# Patient Record
(demographics unavailable — no encounter records)

---

## 2017-05-17 NOTE — ED PDOC
- Laboratory Results


Result Diagrams: 


 05/17/17 00:30





 05/17/17 00:30





- ECG


O2 Sat by Pulse Oximetry: 97





Medical Decision Making


Medical Decision Making: 





Time: 0700





Patient signed out by Dr. Aguayo pending sobriety and crisis evaluation








Scribe Attestation:


Documented by Henna Sage acting as a scribe for Jenny Pereira MD MD Scribe Attestation:


All medical record entries made by the Scribe were at my direction and 

personally dictated by me. I have reviewed the chart and agree that the record 

accurately reflects my personal performance of the history, physical exam, 

medical decision making, and the department course for this patient. I have 

also personally directed, reviewed, and agree with the discharge instructions 

and disposition.





Disposition





- Clinical Impression


Clinical Impression: 


 Alcohol intoxication, Alcohol-induced mood disorder








- POA


Present On Arrival: None





- Disposition


Disposition: Routine/Home


Disposition Time: 10:02


Condition: STABLE

## 2017-05-17 NOTE — ED PDOC
HPI: Psych/Substance Abuse


Time Seen by Provider: 05/17/17 00:03


Chief Complaint (Nursing): Psychiatric Evaluation


ED Caveat: Acuity of Condition


History Per: Patient, EMS


History/Exam Limitations: intoxication


Onset/Duration Of Symptoms: Hrs


Current Symptoms Are (Timing): Still Present


Modifying Factor(s): Alcohol


Additional Complaint(s): 





Pt. brought in by EMS for "psych eval".  PT. admits to drinking alcohol, 

unclear if he is taking his psych meds.  Had verbal argument with partner 

today.  HPI and ROS limited 2/2 to intoxication.





Past Medical History


Vital Signs: 





 Last Vital Signs











Temp  98.9 F   05/16/17 23:55


 


Pulse  160 H  05/16/17 23:55


 


Resp  20   05/16/17 23:55


 


BP      


 


Pulse Ox  97   05/16/17 23:55














- Medical History


PMH: Bipolar Disorder





- Family History


Family History: States: Unknown Family Hx





- Allergies


Allergies/Adverse Reactions: 


 Allergies











Allergy/AdvReac Type Severity Reaction Status Date / Time


 


No Known Allergies Allergy   Verified 05/16/17 23:54














Review of Systems


Review Of Systems: ROS cannot be obtained secondary to pt's inabilty to answer 

questions.





Physical Exam





- Reviewed


Nursing Documentation Reviewed: Yes


Vital Signs Reviewed: Yes





- Physical Exam


Appears: Positive for: Non-toxic.  Negative for: Well (combative, intoxicated 

appearing)


Head Exam: Positive for: ATRAUMATIC, NORMAL INSPECTION, NORMOCEPHALIC


Skin: Positive for: Normal Color, Warm, DRY


Eye Exam: Positive for: EOMI, Normal appearance, PERRL


ENT: Positive for: Normal ENT Inspection


Neck: Positive for: Normal, Painless ROM


Cardiovascular/Chest: Positive for: Tachycardia


Respiratory: Positive for: CNT, Normal Breath Sounds


Gastrointestinal/Abdominal: Positive for: Normal Exam, Bowel Sounds, Soft


Back: Positive for: Normal Inspection


Extremity: Positive for: Normal ROM


Neurologic/Psych: Positive for: Alert, CNs II-XII.  Negative for: Oriented (

unwilling to answer questions properly), Motor/Sensory Deficits, Cerebellar 

Tests, Gait, Aphasia, Facial Droop





- Laboratory Results


Result Diagrams: 


 05/17/17 00:30





 05/17/17 00:30





- ECG


O2 Sat by Pulse Oximetry: 97





Medical Decision Making


Medical Decision Making: 





PT. likely w/ intoxication and psych derailment 2/2 to noncompliance with meds.

  Pt. verbally abusive and threatening to staff requiring chemical sedation.  

Will continue to monitor.  No signs of trauma. 





ED OBSERVATION


Date of observation admission: 05/17/17


Time of observation admission: 01:45





- Observation admission statement


Patient is being placed in observation because:: 





patient is intoxicated, will take significant time for patient to sober up, 

also was given chemical sedation.  clinical sobriety estimated between 5-10 

hours.





- Goals of Observation


Goals of observation are:: 





sobriety.





Disposition





- Clinical Impression


Clinical Impression: 


 Alcohol intoxication








- Disposition


Disposition: Transfer of Care


Disposition Time: 07:00


Condition: STABLE


Patient Signed Over To: Jenny Pereira


Handoff Comments: pending sobriety and Crisis eval

## 2017-10-17 NOTE — ED PDOC
HPI: Psych/Substance Abuse


Time Seen by Provider: 10/17/17 15:18


Chief Complaint (Nursing): Psychiatric Evaluation


Chief Complaint (Provider): crisis eval


History Per: Patient, Family (wife)


History/Exam Limitations: no limitations


Onset/Duration Of Symptoms: Mins (prior to arrival)


Current Symptoms Are (Timing): Still Present


Additional Complaint(s): 





Ramos Reddy is a 44 year old male with previous medical history of bipolar 

disorder, who presents to the emergency department for a psychiatric evaluation 

after wife called police when patient became agitated and combative at home 

prior to arrival. 





PMD: none provided





Past Medical History


Reviewed: Historical Data, Nursing Documentation, Vital Signs


Vital Signs: 





 Last Vital Signs











Temp  97.8 F   10/17/17 14:46


 


Pulse  78   10/17/17 14:46


 


Resp  18   10/17/17 14:46


 


BP  124/75   10/17/17 14:46


 


Pulse Ox  99   10/17/17 14:46














- Medical History


PMH: Bipolar Disorder


   Denies: Diabetes, Hepatitis, HIV, HTN, Chronic Kidney Disease, Seizures, 

Sexually Transmitted Disease





- Surgical History


Surgical History: 


   Denies: No Surg Hx


Other surgeries: right knee





- Family History


Family History: States: Unknown Family Hx





- Social History


Current smoker - smoking cessation education provided: Yes


Alcohol: Occasional


Drugs: Denies





- Immunization History


Hx Tetanus Toxoid Vaccination: No


Hx Influenza Vaccination: No


Hx Pneumococcal Vaccination: No





- Home Medications


Home Medications: 


 Ambulatory Orders











 Medication  Instructions  Recorded


 


Abilify 1 tab PO DAILY 09/02/17


 


Risperidone [Risperdal] 1 mg PO DAILY 09/02/17














- Allergies


Allergies/Adverse Reactions: 


 Allergies











Allergy/AdvReac Type Severity Reaction Status Date / Time


 


Iodine and Iodide Containing Allergy   Verified 09/02/17 14:10





Produc     














Review of Systems


ROS Statement: Except As Marked, All Systems Reviewed And Found Negative


Psych: Positive for: Other (agitated and combative)





Physical Exam





- Reviewed


Nursing Documentation Reviewed: Yes


Vital Signs Reviewed: Yes





- Physical Exam


Appears: Positive for: Well, Non-toxic, No Acute Distress


Head Exam: Positive for: ATRAUMATIC, NORMAL INSPECTION, NORMOCEPHALIC


Cardiovascular/Chest: Positive for: Regular Rate, Rhythm, Chest Non Tender.  

Negative for: Bradycardia, Tachycardia


Respiratory: Positive for: Normal Breath Sounds, Accessory Muscle Use.  

Negative for: Decreased Breath Sounds, Crackles, Rales, Rhonchi, Wheezing, 

Respiratory Distress


Gastrointestinal/Abdominal: Positive for: Normal Exam, Bowel Sounds, Soft.  

Negative for: Tenderness


Neurologic/Psych: Positive for: Alert (x3), Oriented, Mood/Affect (agitated and 

combative)





- Laboratory Results


Result Diagrams: 


 10/17/17 16:25





 10/17/17 16:25





- ECG


O2 Sat by Pulse Oximetry: 99 (RA)


Pulse Ox Interpretation: Normal





Medical Decision Making


Medical Decision Making: 





Initial Impression: Psychiatric evaluation





Initial Plan:


* Acetaminophen


* Alcohol serum


* BMP


* Drug screen, urine


* Salicylate 


* CBC


* Urinalysis


* Crisis evaluation





Time: 1930


alcohol elevated


opiats and marijuana in urine


pt medically cleared for psychiatric evaluation





--Upon crisis evaluation, patient will be discharged home and advised to follow 

up with Dr. Alva. Counseling was provided and all questions were answered 

regarding diagnosis. There is agreement to discharge plan. Return if symptoms 

persist or worsen.





Clinical Impression: Alcohol intoxication





--------------------------------------------------------------------------------

-----------------


Scribe Attestation:


Documented by Riddhi Feliciano, acting as a scribe for Mona Weeks MD.





Provider Scribe Attestation:


All medical record entries made by the Scribe were at my direction and 

personally dictated by me. I have reviewed the chart and agree that the record 

accurately reflects my personal performance of the history, physical exam, 

medical decision making, and the department course for this patient. I have 

also personally directed, reviewed, and agree with the discharge instructions 

and disposition.





Disposition





- Clinical Impression


Clinical Impression: 


 Alcohol intoxication








- Patient ED Disposition


Is Patient to be Admitted: No


Counseled Patient/Family Regarding: Diagnosis





- Disposition


Referrals: 


[facility] [Outside]


Formerly [facility] [Outside]


Disposition: Routine/Home


Disposition Time: 17:50


Condition: IMPROVED


Additional Instructions: 


follow up with your primary doctor in 1-2 days


return to the ED with any worsening or concerning symptoms


Instructions:  Alcohol Intoxication (ED), Polysubstance Abuse (ED)


Forms:  [facility] (English)